# Patient Record
Sex: MALE | Race: WHITE | NOT HISPANIC OR LATINO | Employment: FULL TIME | URBAN - METROPOLITAN AREA
[De-identification: names, ages, dates, MRNs, and addresses within clinical notes are randomized per-mention and may not be internally consistent; named-entity substitution may affect disease eponyms.]

---

## 2023-05-18 ENCOUNTER — OFFICE VISIT (OUTPATIENT)
Dept: PRIMARY CARE | Facility: CLINIC | Age: 33
End: 2023-05-18
Payer: COMMERCIAL

## 2023-05-18 VITALS
OXYGEN SATURATION: 95 % | HEART RATE: 100 BPM | DIASTOLIC BLOOD PRESSURE: 68 MMHG | TEMPERATURE: 97.5 F | HEIGHT: 71 IN | WEIGHT: 171 LBS | BODY MASS INDEX: 23.94 KG/M2 | SYSTOLIC BLOOD PRESSURE: 121 MMHG

## 2023-05-18 DIAGNOSIS — H10.13 ALLERGIC CONJUNCTIVITIS OF BOTH EYES: Primary | ICD-10-CM

## 2023-05-18 DIAGNOSIS — F90.0 ATTENTION DEFICIT HYPERACTIVITY DISORDER (ADHD), PREDOMINANTLY INATTENTIVE TYPE: ICD-10-CM

## 2023-05-18 PROCEDURE — 99213 OFFICE O/P EST LOW 20 MIN: CPT | Performed by: INTERNAL MEDICINE

## 2023-05-18 RX ORDER — OLOPATADINE HYDROCHLORIDE 1 MG/ML
1 SOLUTION/ DROPS OPHTHALMIC 2 TIMES DAILY PRN
Qty: 5 ML | Refills: 1 | Status: SHIPPED | OUTPATIENT
Start: 2023-05-18 | End: 2023-09-15

## 2023-05-18 NOTE — PROGRESS NOTES
"Subjective   Juan Jose Rollins is a 32 y.o. male who presents for Allergies.  HPI  Typically gets symptoms for a few months in early spring.  Takes Claritin and nasal fluticasone.  Runny nose, scratchy throat, mild wheezing, itchy watery eyes.  Symptoms are controlled with OTC except for the eyes.  Fairly severe, actually today not too bad.    Has tried numerous OTCs.  Last eye exam ~2.5 years ago.    Objective   /68   Pulse 100   Temp 36.4 °C (97.5 °F)   Ht 1.803 m (5' 11\")   Wt 77.6 kg (171 lb)   SpO2 95%   BMI 23.85 kg/m²    Physical Exam  NAD, AAOx3  PERRLA, EOMI, no conjunctivitis  MMM, clear OP  No lymphadenopathy    Assessment/Plan   Problem List Items Addressed This Visit    None  Visit Diagnoses       Allergic conjunctivitis of both eyes    -  Primary    Attention deficit hyperactivity disorder (ADHD), predominantly inattentive type        Suspected; children experiencing and have seen great benefit from treatment; given name of therapist, can follow-up here for further evaluation if needed                 Francisco Yancey MD   "